# Patient Record
Sex: FEMALE | Race: WHITE
[De-identification: names, ages, dates, MRNs, and addresses within clinical notes are randomized per-mention and may not be internally consistent; named-entity substitution may affect disease eponyms.]

---

## 2019-10-01 ENCOUNTER — HOSPITAL ENCOUNTER (EMERGENCY)
Dept: HOSPITAL 4 - SED | Age: 29
Discharge: HOME | End: 2019-10-01
Payer: COMMERCIAL

## 2019-10-01 VITALS — WEIGHT: 190 LBS | HEIGHT: 62 IN | BODY MASS INDEX: 34.96 KG/M2

## 2019-10-01 VITALS — SYSTOLIC BLOOD PRESSURE: 124 MMHG

## 2019-10-01 VITALS — SYSTOLIC BLOOD PRESSURE: 131 MMHG

## 2019-10-01 DIAGNOSIS — R03.0: ICD-10-CM

## 2019-10-01 DIAGNOSIS — I73.00: Primary | ICD-10-CM

## 2019-10-01 DIAGNOSIS — F41.9: ICD-10-CM

## 2019-10-01 DIAGNOSIS — N39.0: ICD-10-CM

## 2019-10-01 DIAGNOSIS — F32.9: ICD-10-CM

## 2019-10-01 LAB
ANION GAP SERPL CALCULATED.3IONS-SCNC: 9 MMOL/L (ref 5–15)
APPEARANCE UR: CLEAR
BACTERIA URNS QL MICRO: (no result) /HPF
BASOPHILS # BLD AUTO: 0.1 K/UL (ref 0–0.2)
BASOPHILS NFR BLD AUTO: 1.1 % (ref 0–2)
BILIRUB UR QL STRIP: NEGATIVE
BUN SERPL-MCNC: 8 MG/DL (ref 8–21)
CALCIUM SERPL-MCNC: 9 MG/DL (ref 8.4–11)
CHLORIDE SERPL-SCNC: 103 MMOL/L (ref 98–107)
COLOR UR: YELLOW
CREAT SERPL-MCNC: 0.74 MG/DL (ref 0.55–1.3)
EOSINOPHIL # BLD AUTO: 0.2 K/UL (ref 0–0.4)
EOSINOPHIL NFR BLD AUTO: 1.9 % (ref 0–4)
ERYTHROCYTE [DISTWIDTH] IN BLOOD BY AUTOMATED COUNT: 13.5 % (ref 9–15)
GFR SERPL CREATININE-BSD FRML MDRD: 121 ML/MIN (ref 90–?)
GLUCOSE SERPL-MCNC: 93 MG/DL (ref 70–99)
GLUCOSE UR STRIP-MCNC: NEGATIVE MG/DL
HCT VFR BLD AUTO: 39.6 % (ref 36–48)
HGB BLD-MCNC: 13.4 G/DL (ref 12–16)
HGB UR QL STRIP: NEGATIVE
KETONES UR STRIP-MCNC: NEGATIVE MG/DL
LEUKOCYTE ESTERASE UR QL STRIP: (no result)
LYMPHOCYTES # BLD AUTO: 3.3 K/UL (ref 1–5.5)
LYMPHOCYTES NFR BLD AUTO: 36.3 % (ref 20.5–51.5)
MCH RBC QN AUTO: 32 PG (ref 27–31)
MCHC RBC AUTO-ENTMCNC: 34 % (ref 32–36)
MCV RBC AUTO: 96 FL (ref 79–98)
MONOCYTES # BLD MANUAL: 0.7 K/UL (ref 0–1)
MONOCYTES # BLD MANUAL: 7.6 % (ref 1.7–9.3)
NEUTROPHILS # BLD AUTO: 4.7 K/UL (ref 1.8–7.7)
NEUTROPHILS NFR BLD AUTO: 53.1 % (ref 40–70)
NITRITE UR QL STRIP: NEGATIVE
PH UR STRIP: 6.5 [PH] (ref 5–8)
PLATELET # BLD AUTO: 298 K/UL (ref 130–430)
POTASSIUM SERPL-SCNC: 3.9 MMOL/L (ref 3.5–5.1)
PROT UR QL STRIP: NEGATIVE
RBC # BLD AUTO: 4.15 MIL/UL (ref 4.2–6.2)
RBC #/AREA URNS HPF: (no result) /HPF (ref 0–3)
SODIUM SERPLBLD-SCNC: 138 MMOL/L (ref 136–145)
SP GR UR STRIP: 1.01 (ref 1–1.03)
UROBILINOGEN UR STRIP-MCNC: 0.2 MG/DL (ref 0.2–1)
WBC # BLD AUTO: 9 K/UL (ref 4.8–10.8)
WBC #/AREA URNS HPF: (no result) /HPF (ref 0–3)

## 2019-10-01 NOTE — NUR
Patient given written and verbal discharge instructions and verbalizes 
understanding.  ER NP Andree discussed with patient the results and treatment 
provided. Patient in stable condition. ID arm band removed. Rx of Macrobid 
given. Patient educated on pain management and to follow up with PMD. Pain 
Scale 0. Opportunity for questions provided and answered. Medication side 
effect fact sheet provided.

## 2019-10-01 NOTE — NUR
Patient to ER [facility] to [facility] for evaluation. Side rails up. Report given to 
Lesley OLMOS.

## 2019-10-01 NOTE — NUR
-------------------------------------------------------------------------------

           *** Note undone in EDM - 10/01/19 at 1859 by [email or web address] ***            

-------------------------------------------------------------------------------

Patient given written and verbal discharge instructions and verbalizes 
understanding.  ER ANISHA Candelario discussed with patient the results and treatment 
provided. Patient in stable condition. ID arm band removed. Rx of Macrobid 
given. Patient educated on pain management and to follow up with PMD. Pain 
Scale 0. Opportunity for questions provided and answered. Medication side 
effect fact sheet provided.

## 2019-10-01 NOTE — NUR
Pt AAOx4 ambulated into ED c/o tingling, discoloration to bilateral hands and 
chills/body aches prior to arrival. Denies provoking 
factors/n/v/d/headache/cp/sob. Skin pink dry and warm, breathing even and 
unlabored. No other injuries/complaints per pt/noted. Will continue to monitor.